# Patient Record
Sex: FEMALE | Race: BLACK OR AFRICAN AMERICAN
[De-identification: names, ages, dates, MRNs, and addresses within clinical notes are randomized per-mention and may not be internally consistent; named-entity substitution may affect disease eponyms.]

---

## 2019-06-28 ENCOUNTER — HOSPITAL ENCOUNTER (OUTPATIENT)
Dept: HOSPITAL 96 - M.MRI | Age: 34
End: 2019-06-28
Attending: INTERNAL MEDICINE
Payer: COMMERCIAL

## 2019-06-28 DIAGNOSIS — F17.200: ICD-10-CM

## 2019-06-28 DIAGNOSIS — J45.909: ICD-10-CM

## 2019-06-28 DIAGNOSIS — R51: Primary | ICD-10-CM

## 2019-06-28 DIAGNOSIS — R42: ICD-10-CM

## 2019-06-28 DIAGNOSIS — F32.9: ICD-10-CM

## 2019-06-28 DIAGNOSIS — F41.9: ICD-10-CM

## 2019-07-16 ENCOUNTER — HOSPITAL ENCOUNTER (OUTPATIENT)
Dept: HOSPITAL 96 - M.CRD | Age: 34
End: 2019-07-16
Attending: INTERNAL MEDICINE
Payer: COMMERCIAL

## 2019-07-16 DIAGNOSIS — R07.89: ICD-10-CM

## 2019-07-16 DIAGNOSIS — R00.2: Primary | ICD-10-CM

## 2019-07-16 NOTE — 2DMMODE
[place] [address]
Phone:  [phone] 2 D/M-MODE ECHOCARDIOGRAM     
_______________________________________________________________________________
 
Name:         VINAY PRATT           Room:                     REG CLI
M.R.#:    [id number]     Account #:     [id number]  
Admission:    19    Attend Phys:   Tien Donohue MD
Discharge:                Date of Birth: [birth year]  
Date of Service: 19 1529  Report #:      [id number]
        [id number]
_______________________________________________________________________________
THIS REPORT FOR:  //name//                      
 
 
--------------- APPROVED REPORT --------------
 
 
Study performed:  2019 11:02:19
 
EXAM: Comprehensive 2D, Doppler, and color-flow 
Echocardiogram 
Patient Location: Out-Patient   
 
      BSA:         1.89
HR: 85 bpm BP:          118/70 mmHg 
 
Other Information 
Study Quality: Good
 
Indications
Chest Pain
 
2D Dimensions
IVSd:  10.43 (7-11mm) LVOT Diam:  21.45 (18-24mm) 
LVDd:  41.15 mm  
PWd:  8.79 (7-11mm) Ascending Ao:  24.08 (22-36mm)
LVDs:  31.11 (25-40mm) 
Aortic Root:  25.44 mm 
 
Volumes
Left Atrial Volume (Systole) 
    LA ESV Index:  19.50 mL/m2
 
Aortic Valve
AoV Peak Walter.:  1.33 m/s 
AO Peak Gr.:  7.13 mmHg  LVOT Max P.50 mmHg
AO Mean Gr.:  3.86 mmHg  LVOT Mean P.96 mmHg
    LVOT Max V:  1.06 m/s
AO V2 VTI:  22.08 cm  LVOT Mean V:  0.61 m/s
BARTOLO (VTI):  3.24 cm2  LVOT V1 VTI:  19.81 cm
 
Mitral Valve
    E/A Ratio:  1.41
    MV Decel. Time:  219.63 ms
MV E Max Walter.:  0.82 m/s 
MV PHT:  63.69 ms  
MVA (PHT):  3.45 cm2  
 
 
[place] [address]
Phone:  [phone]                     2 D/M-MODE ECHOCARDIOGRAM     
_______________________________________________________________________________
 
Name:         VINAY PRATT           Room:                     REG CLI
M.R.#:    [id number]     Account #:     [id number]  
Admission:    19    Attend Phys:   Tien Donohue MD
Discharge:                Date of Birth: [birth year]  
Date of Service: 19 1529  Report #:      [id number]
        [id number]
_______________________________________________________________________________
 
TDI
E/Lateral E':  3.90 E/Medial E':  5.86
   Medial E' Walter.:  0.14 m/s
   Lateral E' Walter.:  0.21 m/s
 
Pulmonary Valve
PV Peak Walter.:  0.91 m/s PV Peak Gr.:  3.32 mmHg
 
Left Ventricle
The left ventricle is normal size. There is normal LV segmental wall 
motion. There is normal left ventricular wall thickness. Left 
ventricular systolic function is normal. LVEF is 60-65%. The left 
ventricular diastolic function is normal.
 
Right Ventricle
The right ventricle is normal size. The right ventricular systolic 
function is normal.
 
Atria
The left atrium size is normal. The right atrium size is 
normal.
 
Aortic Valve
The aortic valve is normal in structure. No aortic regurgitation is 
present. There is no aortic valvular stenosis.
 
Mitral Valve
The mitral valve is normal in structure. Trace mitral regurgitation. 
No evidence of mitral valve stenosis.
 
Tricuspid Valve
The tricuspid valve is normal in structure. There is no tricuspid 
valve regurgitation noted.
 
Pulmonic Valve
The pulmonary valve is normal in structure. There is no pulmonic 
valvular regurgitation.
 
Great Vessels
The aortic root is normal in size. IVC is normal in size and 
collapses >50% with inspiration.
 
Pericardium
There is no pericardial effusion.
 
 
 
[place] [address]
Phone:  [phone]                     2 D/M-MODE ECHOCARDIOGRAM     
_______________________________________________________________________________
 
Name:         VINAY PRATT           Room:                     REG CLI
M.R.#:    [id number]     Account #:     [id number]  
Admission:    19    Attend Phys:   Tien Donohue MD
Discharge:                Date of Birth: [birth year]  
Date of Service: 19 1529  Report #:      [id number]
        [id number]
_______________________________________________________________________________
<Conclusion>
The left ventricle is normal size.
There is normal left ventricular wall thickness.
Left ventricular systolic function is normal.
LVEF is 60-65%.
The left ventricular diastolic function is normal.
Trace mitral regurgitation.
IVC is normal in size and collapses >50% with inspiration.
 
 
 
 
 
 
 
 
 
 
 
 
 
 
 
 
 
 
 
 
 
 
 
 
 
 
 
 
 
 
 
 
 
 
 
 
  <ELECTRONICALLY SIGNED>
                                           By: Johnny Aguilar MD, FACC   
  19     1529
D: 19 1529   _____________________________________
T: 19 1529   Johnny Aguilar MD, FACC     /INF